# Patient Record
Sex: FEMALE | Race: BLACK OR AFRICAN AMERICAN | Employment: OTHER | URBAN - METROPOLITAN AREA
[De-identification: names, ages, dates, MRNs, and addresses within clinical notes are randomized per-mention and may not be internally consistent; named-entity substitution may affect disease eponyms.]

---

## 2019-11-07 ENCOUNTER — HOSPITAL ENCOUNTER (OUTPATIENT)
Dept: GENERAL RADIOLOGY | Age: 69
Discharge: HOME | End: 2019-11-09
Payer: MEDICARE

## 2019-11-07 DIAGNOSIS — Z12.31 ENCOUNTER FOR SCREENING MAMMOGRAM FOR MALIGNANT NEOPLASM OF BREAST: ICD-10-CM

## 2019-11-07 PROCEDURE — 77063 BREAST TOMOSYNTHESIS BI: CPT

## 2019-11-19 ENCOUNTER — TELEPHONE (OUTPATIENT)
Dept: ONCOLOGY | Age: 69
End: 2019-11-19

## 2021-04-13 ENCOUNTER — IMMUNIZATION (OUTPATIENT)
Dept: PRIMARY CARE CLINIC | Age: 71
End: 2021-04-13
Payer: MEDICARE

## 2021-04-13 PROCEDURE — 91300 COVID-19, PFIZER VACCINE 30MCG/0.3ML DOSE: CPT | Performed by: NURSE PRACTITIONER

## 2021-04-13 PROCEDURE — 0001A COVID-19, PFIZER VACCINE 30MCG/0.3ML DOSE: CPT | Performed by: NURSE PRACTITIONER

## 2021-05-05 ENCOUNTER — IMMUNIZATION (OUTPATIENT)
Dept: PRIMARY CARE CLINIC | Age: 71
End: 2021-05-05
Payer: MEDICARE

## 2021-05-05 PROCEDURE — 0002A COVID-19, PFIZER VACCINE 30MCG/0.3ML DOSE: CPT | Performed by: NURSE PRACTITIONER

## 2021-05-05 PROCEDURE — 91300 COVID-19, PFIZER VACCINE 30MCG/0.3ML DOSE: CPT | Performed by: NURSE PRACTITIONER

## 2022-06-19 ENCOUNTER — HOSPITAL ENCOUNTER (EMERGENCY)
Age: 72
Discharge: HOME | End: 2022-06-19
Attending: EMERGENCY MEDICINE
Payer: MEDICARE

## 2022-06-19 ENCOUNTER — APPOINTMENT (OUTPATIENT)
Dept: GENERAL RADIOLOGY | Age: 72
End: 2022-06-19
Payer: MEDICARE

## 2022-06-19 VITALS
RESPIRATION RATE: 16 BRPM | BODY MASS INDEX: 20.4 KG/M2 | OXYGEN SATURATION: 96 % | HEIGHT: 67 IN | SYSTOLIC BLOOD PRESSURE: 174 MMHG | HEART RATE: 98 BPM | DIASTOLIC BLOOD PRESSURE: 86 MMHG | TEMPERATURE: 99 F | WEIGHT: 130 LBS

## 2022-06-19 DIAGNOSIS — Z79.4 TYPE 2 DIABETES MELLITUS WITH HYPERGLYCEMIA, WITH LONG-TERM CURRENT USE OF INSULIN (HCC): ICD-10-CM

## 2022-06-19 DIAGNOSIS — E11.65 TYPE 2 DIABETES MELLITUS WITH HYPERGLYCEMIA, WITH LONG-TERM CURRENT USE OF INSULIN (HCC): ICD-10-CM

## 2022-06-19 DIAGNOSIS — R52 BODY ACHES: ICD-10-CM

## 2022-06-19 DIAGNOSIS — U07.1 COVID-19: Primary | ICD-10-CM

## 2022-06-19 LAB
ALBUMIN SERPL-MCNC: 4.1 G/DL (ref 3.5–5.2)
ALP BLD-CCNC: 117 U/L (ref 35–104)
ALT SERPL-CCNC: 18 U/L (ref 0–32)
ANION GAP SERPL CALCULATED.3IONS-SCNC: 16 MMOL/L (ref 7–16)
AST SERPL-CCNC: 28 U/L (ref 0–31)
BASOPHILS ABSOLUTE: 0.06 E9/L (ref 0–0.2)
BASOPHILS RELATIVE PERCENT: 0.9 % (ref 0–2)
BETA-HYDROXYBUTYRATE: 0.45 MMOL/L (ref 0.02–0.27)
BILIRUB SERPL-MCNC: 0.4 MG/DL (ref 0–1.2)
BUN BLDV-MCNC: 14 MG/DL (ref 6–23)
BURR CELLS: ABNORMAL
CALCIUM SERPL-MCNC: 9.4 MG/DL (ref 8.6–10.2)
CHLORIDE BLD-SCNC: 100 MMOL/L (ref 98–107)
CHP ED QC CHECK: NORMAL
CO2: 22 MMOL/L (ref 22–29)
CREAT SERPL-MCNC: 0.7 MG/DL (ref 0.5–1)
EKG ATRIAL RATE: 103 BPM
EKG P AXIS: 34 DEGREES
EKG P-R INTERVAL: 180 MS
EKG Q-T INTERVAL: 346 MS
EKG QRS DURATION: 80 MS
EKG QTC CALCULATION (BAZETT): 453 MS
EKG R AXIS: -23 DEGREES
EKG T AXIS: 27 DEGREES
EKG VENTRICULAR RATE: 103 BPM
EOSINOPHILS ABSOLUTE: 0.35 E9/L (ref 0.05–0.5)
EOSINOPHILS RELATIVE PERCENT: 5.2 % (ref 0–6)
GFR AFRICAN AMERICAN: >60
GFR NON-AFRICAN AMERICAN: >60 ML/MIN/1.73
GLUCOSE BLD-MCNC: 311 MG/DL
GLUCOSE BLD-MCNC: 326 MG/DL (ref 74–99)
HCT VFR BLD CALC: 42.7 % (ref 34–48)
HEMOGLOBIN: 14.1 G/DL (ref 11.5–15.5)
LYMPHOCYTES ABSOLUTE: 0.4 E9/L (ref 1.5–4)
LYMPHOCYTES RELATIVE PERCENT: 6.1 % (ref 20–42)
MCH RBC QN AUTO: 26.5 PG (ref 26–35)
MCHC RBC AUTO-ENTMCNC: 33 % (ref 32–34.5)
MCV RBC AUTO: 80.3 FL (ref 80–99.9)
METER GLUCOSE: 311 MG/DL (ref 74–99)
MONOCYTES ABSOLUTE: 0.34 E9/L (ref 0.1–0.95)
MONOCYTES RELATIVE PERCENT: 5.2 % (ref 2–12)
MYELOCYTE PERCENT: 0.9 % (ref 0–0)
NEUTROPHILS ABSOLUTE: 5.56 E9/L (ref 1.8–7.3)
NEUTROPHILS RELATIVE PERCENT: 81.7 % (ref 43–80)
NUCLEATED RED BLOOD CELLS: 0 /100 WBC
OVALOCYTES: ABNORMAL
PDW BLD-RTO: 14.2 FL (ref 11.5–15)
PH VENOUS: 7.4 (ref 7.35–7.45)
PLATELET # BLD: 290 E9/L (ref 130–450)
PMV BLD AUTO: 10.6 FL (ref 7–12)
POTASSIUM REFLEX MAGNESIUM: 4.1 MMOL/L (ref 3.5–5)
RBC # BLD: 5.32 E12/L (ref 3.5–5.5)
SARS-COV-2, NAAT: DETECTED
SODIUM BLD-SCNC: 138 MMOL/L (ref 132–146)
TEAR DROP CELLS: ABNORMAL
TOTAL PROTEIN: 7.8 G/DL (ref 6.4–8.3)
TROPONIN, HIGH SENSITIVITY: 12 NG/L (ref 0–9)
WBC # BLD: 6.7 E9/L (ref 4.5–11.5)

## 2022-06-19 PROCEDURE — 82010 KETONE BODYS QUAN: CPT

## 2022-06-19 PROCEDURE — 80053 COMPREHEN METABOLIC PANEL: CPT

## 2022-06-19 PROCEDURE — 96361 HYDRATE IV INFUSION ADD-ON: CPT

## 2022-06-19 PROCEDURE — 99285 EMERGENCY DEPT VISIT HI MDM: CPT

## 2022-06-19 PROCEDURE — 71045 X-RAY EXAM CHEST 1 VIEW: CPT

## 2022-06-19 PROCEDURE — 82962 GLUCOSE BLOOD TEST: CPT

## 2022-06-19 PROCEDURE — 93005 ELECTROCARDIOGRAM TRACING: CPT | Performed by: STUDENT IN AN ORGANIZED HEALTH CARE EDUCATION/TRAINING PROGRAM

## 2022-06-19 PROCEDURE — 2580000003 HC RX 258: Performed by: STUDENT IN AN ORGANIZED HEALTH CARE EDUCATION/TRAINING PROGRAM

## 2022-06-19 PROCEDURE — 87635 SARS-COV-2 COVID-19 AMP PRB: CPT

## 2022-06-19 PROCEDURE — 96360 HYDRATION IV INFUSION INIT: CPT

## 2022-06-19 PROCEDURE — 85025 COMPLETE CBC W/AUTO DIFF WBC: CPT

## 2022-06-19 PROCEDURE — 84484 ASSAY OF TROPONIN QUANT: CPT

## 2022-06-19 PROCEDURE — 82800 BLOOD PH: CPT

## 2022-06-19 PROCEDURE — 6370000000 HC RX 637 (ALT 250 FOR IP): Performed by: STUDENT IN AN ORGANIZED HEALTH CARE EDUCATION/TRAINING PROGRAM

## 2022-06-19 RX ORDER — 0.9 % SODIUM CHLORIDE 0.9 %
1000 INTRAVENOUS SOLUTION INTRAVENOUS ONCE
Status: COMPLETED | OUTPATIENT
Start: 2022-06-19 | End: 2022-06-19

## 2022-06-19 RX ORDER — ACETAMINOPHEN 325 MG/1
650 TABLET ORAL ONCE
Status: COMPLETED | OUTPATIENT
Start: 2022-06-19 | End: 2022-06-19

## 2022-06-19 RX ADMIN — SODIUM CHLORIDE 1000 ML: 9 INJECTION, SOLUTION INTRAVENOUS at 13:36

## 2022-06-19 RX ADMIN — ACETAMINOPHEN 650 MG: 325 TABLET ORAL at 13:12

## 2022-06-19 ASSESSMENT — PAIN SCALES - GENERAL: PAINLEVEL_OUTOF10: 5

## 2022-06-19 NOTE — ED PROVIDER NOTES
Department of Emergency Medicine   ED  Provider Note  Admit Date/RoomTime: 6/19/2022 12:44 PM  ED Room: 17/17 6/19/22  1:00 PM EDT    History of Present Illness:   Veronika Deshpande is a 70 y.o. female with a past medical history of diabetes presenting to the ED for a dry cough, body aches, fever and fatigue, beginning last night. The complaint has been persistent, moderate in severity, and worsened by nothing. Patient states her niece was recently diagnosed with [phone] and has been in contact with her. She received 2 of her COVID vaccines. Patient has never tested positive for COVID. She states her temperature was 101 at home. Patient did not take a Tylenol. She denies any nausea, vomiting, chest pain, shortness of breath, diarrhea, constipation, or abdominal pain. Review of Systems:   A complete review of systems was performed and pertinent positives and negatives are stated within HPI, all other systems reviewed and are negative.          --------------------------------------------- PAST HISTORY ---------------------------------------------  Past Medical History:  has a past medical history of Diabetes mellitus ([facility] [address] Hyperlipidemia, and Hypertension. Past Surgical History:  has no past surgical history on file. Social History:  reports that she has never smoked. She does not have any smokeless tobacco history on file. She reports that she does not drink alcohol and does not use drugs. Family History: family history is not on file. Unless otherwise noted, family history is non contributory    The patients home medications have been reviewed. Allergies: Patient has no known allergies.     -------------------------------------------------- RESULTS -------------------------------------------------  All laboratory and radiology results have been personally reviewed by myself   LABS:  Results for orders placed or performed during the hospital encounter of 06/19/22   COVID-19, Rapid    Specimen: Nasopharyngeal Swab   Result Value Ref Range    SARS-CoV-2, NAAT DETECTED (A) Not Detected   pH, venous   Result Value Ref Range    pH, Rufus 7.40 7.35 - 7.45   Beta-Hydroxybutyrate   Result Value Ref Range    Beta-Hydroxybutyrate 0.45 (H) 0.02 - 0.27 mmol/L   CBC with Auto Differential   Result Value Ref Range    WBC 6.7 4.5 - 11.5 E9/L    RBC 5.32 3.50 - 5.50 E12/L    Hemoglobin 14.1 11.5 - 15.5 g/dL    Hematocrit 42.7 34.0 - 48.0 %    MCV 80.3 80.0 - 99.9 fL    MCH 26.5 26.0 - 35.0 pg    MCHC 33.0 32.0 - 34.5 %    RDW 14.2 11.5 - 15.0 fL    Platelets 839 852 - 976 E9/L    MPV 10.6 7.0 - 12.0 fL    Neutrophils % 81.7 (H) 43.0 - 80.0 %    Lymphocytes % 6.1 (L) 20.0 - 42.0 %    Monocytes % 5.2 2.0 - 12.0 %    Eosinophils % 5.2 0.0 - 6.0 %    Basophils % 0.9 0.0 - 2.0 %    Neutrophils Absolute 5.56 1.80 - 7.30 E9/L    Lymphocytes Absolute 0.40 (L) 1.50 - 4.00 E9/L    Monocytes Absolute 0.34 0.10 - 0.95 E9/L    Eosinophils Absolute 0.35 0.05 - 0.50 E9/L    Basophils Absolute 0.06 0.00 - 0.20 E9/L    Myelocyte Percent 0.9 0 - 0 %    nRBC 0.0 /100 WBC    Norborne Cells 1+     Ovalocytes 1+     Tear Drop Cells 1+    Comprehensive Metabolic Panel w/ Reflex to MG   Result Value Ref Range    Sodium 138 132 - 146 mmol/L    Potassium reflex Magnesium 4.1 3.5 - 5.0 mmol/L    Chloride 100 98 - 107 mmol/L    CO2 22 22 - 29 mmol/L    Anion Gap 16 7 - 16 mmol/L    Glucose 326 (H) 74 - 99 mg/dL    BUN 14 6 - 23 mg/dL    CREATININE 0.7 0.5 - 1.0 mg/dL    GFR Non-African American >60 >=60 mL/min/1.73    GFR African American >60     Calcium 9.4 8.6 - 10.2 mg/dL    Total Protein 7.8 6.4 - 8.3 g/dL    Albumin 4.1 3.5 - 5.2 g/dL    Total Bilirubin 0.4 0.0 - 1.2 mg/dL    Alkaline Phosphatase 117 (H) 35 - 104 U/L    ALT 18 0 - 32 U/L    AST 28 0 - 31 U/L   Troponin   Result Value Ref Range    Troponin, High Sensitivity 12 (H) 0 - 9 ng/L   POCT Glucose   Result Value Ref Range    Glucose 311 mg/dL    QC OK? ok    POCT Glucose Result Value Ref Range    Meter Glucose 311 (H) 74 - 99 mg/dL   EKG 12 Lead   Result Value Ref Range    Ventricular Rate 103 BPM    Atrial Rate 103 BPM    P-R Interval 180 ms    QRS Duration 80 ms    Q-T Interval 346 ms    QTc Calculation (Bazett) 453 ms    P Axis 34 degrees    R Axis -23 degrees    T Axis 27 degrees       RADIOLOGY:  Interpreted by Radiologist.  XR CHEST PORTABLE   Final Result   No acute process. ------------------------- NURSING NOTES AND VITALS REVIEWED ---------------------------   The nursing notes within the ED encounter and vital signs as below have been reviewed. BP (!) 174/86   Pulse 98   Temp 99 °F (37.2 °C) (Temporal)   Resp 16   Ht 5' 6.5\" (1.689 m)   Wt 130 lb (59 kg)   SpO2 96%   BMI 20.67 kg/m²   Oxygen Saturation Interpretation: Normal      ---------------------------------------------------PHYSICAL EXAM--------------------------------------    Constitutional/General: Alert and oriented x3, well appearing, non toxic in NAD  Head: Normocephalic and atraumatic  Eyes: PERRL, EOMI, conjunctiva normal, sclera non icteric  Mouth: Oropharynx clear, handling secretions, no trismus, no asymmetry of the posterior oropharynx or uvular edema  Neck: Supple, full ROM, non tender to palpation in the midline, no stridor, no crepitus, no meningeal signs  Respiratory: Lungs clear to auscultation bilaterally, no wheezes, rales, or rhonchi. Not in respiratory distress  Cardiovascular: Tachycardic. Regular rhythm. No murmurs, gallops, or rubs. 2+ distal pulses  Chest: No chest wall tenderness  GI:  Abdomen Soft, Non tender, Non distended. No organomegaly, no palpable masses,  No rebound, guarding, or rigidity. Musculoskeletal: Moves all extremities x 4. Warm and well perfused, no clubbing, cyanosis, or edema. Capillary refill <3 seconds  Integument: skin warm and dry. No rashes.    Lymphatic: no lymphadenopathy noted  Neurologic: GCS 15, no focal deficits, symmetric strength 5/5 in the upper and lower extremities bilaterally  Psychiatric: Normal Affect      ------------------------------ ED COURSE/MEDICAL DECISION MAKING----------------------  Medications   acetaminophen (TYLENOL) tablet 650 mg (650 mg Oral Given 6/19/22 1312)   0.9 % sodium chloride bolus (0 mLs IntraVENous Stopped 6/19/22 1515)     ED Course as of 06/19/22 1705   Sun Jun 19, 2022   2150 Sin Abraham:     I have personally performed and/or participated in the history, exam, medical decision making, and procedures and agree with all pertinent clinical information unless otherwise noted. I have also reviewed and agree with the past medical, family and social history unless otherwise noted. I have discussed this patient in detail with the resident and provided the instruction and education regarding the evidence-based evaluation and treatment of myalgia, fever. Fatemeh Ovalles History: Patient with constitutional symptoms including general myalgias, nonproductive cough and fever. She has been exposed to relatives who was recently diagnosed with COVID. My findings: Mary Carter is a 70 y.o. female whom is in no distress. Physical exam reveals she is alert and oriented. Heart is regular, lungs are coarse. Abdomen soft nontender but extremities are intact no edema. Skin is warm and dry. My plan: Symptomatic and supportive care. COVID testing, labs. X-ray. Electronically signed by Renita Padilla DO on 6/19/22 at 1:49 PM EDT       [TG]   1354 EKG read and interpreted by me. Rate 103, sinus tachycardia, left axis deviation, normal NE interval, , T wave inversions in lead III, no ST elevation, stable compared to previous EKG. [TO]      ED Course User Index  [TG] Renita Padilla DO  [TO] Hellen Monet DO         Medical Decision Making:    Patient's symptoms are consistent with COVID-19 infection. Patient has stable vital signs and good oxygen saturations at rest and with ambulation.  No evidence of respiratory distress. Baseline mentation. No acute emergent findings in the ED. Patient is appropriate for outpatient management and PCP follow up. Supportive care instructions and strict ED return precautions discussed. Patient was discharged with PACs loaded and given a pulse oximeter. Counseling: The emergency provider has spoken with the patient and discussed todays results, in addition to providing specific details for the plan of care and counseling regarding the diagnosis and prognosis. Questions are answered at this time and they are agreeable with the plan.      --------------------------------- IMPRESSION AND DISPOSITION ---------------------------------    IMPRESSION  1. COVID-19    2. Type 2 diabetes mellitus with hyperglycemia, with long-term current use of insulin (HCC)    3.  Body aches        DISPOSITION  Disposition: Discharge to home  Patient condition is stable             Milka Martini DO  Resident  06/19/22 [id number]

## 2022-06-20 ENCOUNTER — CARE COORDINATION (OUTPATIENT)
Dept: CARE COORDINATION | Age: 72
End: 2022-06-20

## 2022-06-20 NOTE — CARE COORDINATION
Date/Time:  2022 12:17 PM  Attempted to reach patient by telephone. Call within 2 business days of discharge: Yes  Unable to leave message, no answer and no voice mail available. Will attempt to reach patient again. Patient contacted regarding COVID-19 diagnosis. Discussed COVID-19 related testing which was available at this time. Test results were positive. Patient informed of results, if available? Yes. Ambulatory Care Manager contacted the patient by telephone to perform post discharge assessment. Call within 2 business days of discharge: Yes. Verified name and  with patient as identifiers. Provided introduction to self, and explanation of the CTN/ACM role, and reason for call due to risk factors for infection and/or exposure to COVID-19. Symptoms reviewed with patient who verbalized the following symptoms: no new symptoms  no worsening symptoms  headache. Due to no new or worsening symptoms encounter was not routed to provider for escalation. Discussed follow-up appointments. If no appointment was previously scheduled, appointment scheduling offered: Yes. [facility] follow up appointment(s): No future appointments. Non-[facility] follow up appointment(s): na    Non-face-to-face services provided:  Obtained and reviewed discharge summary and/or continuity of care documents     Advance Care Planning:   Does patient have an Advance Directive:  reviewed and current. Educated patient about risk for severe COVID-19 due to risk factors according to CDC guidelines. ACM reviewed discharge instructions, medical action plan and red flag symptoms with the patient who verbalized understanding. Discussed COVID vaccination status: Yes. Education provided on COVID-19 vaccination as appropriate. Discussed exposure protocols and quarantine with CDC Guidelines.  Patient was given an opportunity to verbalize any questions and concerns and agrees to contact ACM or health care provider for questions related to their healthcare. Reviewed and educated patient on any new and changed medications related to discharge diagnosis     Was patient discharged with a pulse oximeter? Yes Discussed and confirmed pulse oximeter discharge instructions and when to notify provider or seek emergency care. ACM provided contact information. No further follow-up call identified based on severity of symptoms and risk factors.

## 2022-11-01 ENCOUNTER — HOSPITAL ENCOUNTER (EMERGENCY)
Age: 72
Discharge: HOME | End: 2022-11-01
Payer: MEDICARE

## 2022-11-01 ENCOUNTER — APPOINTMENT (OUTPATIENT)
Dept: GENERAL RADIOLOGY | Age: 72
End: 2022-11-01
Payer: MEDICARE

## 2022-11-01 VITALS
OXYGEN SATURATION: 97 % | DIASTOLIC BLOOD PRESSURE: 99 MMHG | HEIGHT: 66 IN | SYSTOLIC BLOOD PRESSURE: 179 MMHG | BODY MASS INDEX: 20.09 KG/M2 | HEART RATE: 98 BPM | WEIGHT: 125 LBS | TEMPERATURE: 98.1 F | RESPIRATION RATE: 16 BRPM

## 2022-11-01 DIAGNOSIS — M25.461 KNEE EFFUSION, RIGHT: ICD-10-CM
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
## 2022-11-01 DIAGNOSIS — S83.91XA SPRAIN OF RIGHT KNEE, UNSPECIFIED LIGAMENT, INITIAL ENCOUNTER: ICD-10-CM

## 2022-11-01 PROCEDURE — 6370000000 HC RX 637 (ALT 250 FOR IP)

## 2022-11-01 PROCEDURE — 99283 EMERGENCY DEPT VISIT LOW MDM: CPT

## 2022-11-01 PROCEDURE — 73562 X-RAY EXAM OF KNEE 3: CPT

## 2022-11-01 PROCEDURE — 73502 X-RAY EXAM HIP UNI 2-3 VIEWS: CPT

## 2022-11-01 PROCEDURE — 73552 X-RAY EXAM OF FEMUR 2/>: CPT

## 2022-11-01 RX ORDER — OXYCODONE HYDROCHLORIDE AND ACETAMINOPHEN 5; 325 MG/1; MG/1
1 TABLET ORAL ONCE
Status: COMPLETED | OUTPATIENT
Start: 2022-11-01 | End: 2022-11-01

## 2022-11-01 RX ORDER — TRAMADOL HYDROCHLORIDE 50 MG/1
50 TABLET ORAL EVERY 6 HOURS PRN
Qty: 12 TABLET | Refills: 0 | Status: SHIPPED | OUTPATIENT
Start: 2022-11-01 | End: 2022-11-04

## 2022-11-01 RX ORDER — NAPROXEN 500 MG/1
500 TABLET ORAL ONCE
Status: COMPLETED | OUTPATIENT
Start: 2022-11-01 | End: 2022-11-01

## 2022-11-01 RX ADMIN — OXYCODONE AND ACETAMINOPHEN 1 TABLET: 5; 325 TABLET ORAL at 18:01

## 2022-11-01 RX ADMIN — NAPROXEN 500 MG: 500 TABLET ORAL at 18:01

## 2022-11-01 ASSESSMENT — PAIN DESCRIPTION - FREQUENCY: FREQUENCY: INTERMITTENT

## 2022-11-01 ASSESSMENT — PAIN SCALES - GENERAL
PAINLEVEL_OUTOF10: 9
PAINLEVEL_OUTOF10: 8

## 2022-11-01 ASSESSMENT — PAIN - FUNCTIONAL ASSESSMENT: PAIN_FUNCTIONAL_ASSESSMENT: 0-10

## 2022-11-01 ASSESSMENT — PAIN DESCRIPTION - ORIENTATION: ORIENTATION: RIGHT

## 2022-11-01 ASSESSMENT — PAIN DESCRIPTION - LOCATION: LOCATION: KNEE

## 2022-11-01 ASSESSMENT — PAIN DESCRIPTION - PAIN TYPE: TYPE: ACUTE PAIN

## 2022-11-01 ASSESSMENT — PAIN DESCRIPTION - DESCRIPTORS: DESCRIPTORS: SHARP

## 2022-11-01 NOTE — ED PROVIDER NOTES
[facility]  Department of Emergency Medicine   ED  Encounter Note  Admit Date/RoomTime: 2022  4:40 PM  ED Room: [facility]    NAME: Darion Lozano  : 1950  MRN: [id number]     Chief Complaint:  Margrett Minder Wiljuliettemenia Konig and hurt right knee, denies hitting head unsure if she is on blood thinners)    History of Present Illness       Darion Lozano is a 70 y.o. old female who presents to the emergency department by private vehicle, for a mechanical fall which occured at 0500 yesterday at home. She reportedly bent over to pick medication up off of her floor and her leg gave out, causing her to fall backwards onto her buttocks. She believes she twisted her right knee in the process. 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 Prior to incident she had no complaints of sinus, lightheadedness, chest pain, palpitations. T Since onset the symptoms have been persistent. Her pain is aggraveated by certain movements, pressure on or palpation of painful area, or weight bearing and relieved by nothing. She has taken ibuprofen with no relief. She denies any head injury, neck pain, back pain, headache, loss of consciousness, dizziness, neck pain, chest pain, back pain, numbness, weakness, blurred vision, nausea, vomiting, or wounds. She takes ASA. She normally walks with assistance of a wheeled walker. She also has a regular walker at home for use. Daniela Belton while bending to  medication offf of floor at 055 yesterday. Fell backeards onto buttocks, but legs hyperextended outward. IBU yesterdya with no improvement. R knee swollen laterally and supraptellear. Decreased ROM, worse with extension. ROS   Pertinent positives and negatives are stated within HPI, all other systems reviewed and are negative. Past Medical History:  has a past medical history of Diabetes mellitus ([address] Hyperlipidemia, and Hypertension.     Surgical History:  has no past surgical history on file. Social History:  reports that she has never smoked. She has never used smokeless tobacco. She reports that she does not drink alcohol and does not use drugs. Family History: family history is not on file. Allergies: Patient has no known allergies. Physical Exam   Oxygen Saturation Interpretation: Normal.        ED Triage Vitals   BP Temp Temp Source Heart Rate Resp SpO2 Height Weight   11/01/22 1603 11/01/22 1544 11/01/22 1544 11/01/22 1544 11/01/22 1603 11/01/22 1544 11/01/22 1603 11/01/22 1603   (!) 179/99 98.1 °F (36.7 °C) Oral 98 16 97 % 5' 6\" (1.676 m) 125 lb (56.7 kg)         Physical Exam  Constitutional:  Alert, development consistent with age. HEENT:  NC/NT. Airway patent. Neck:  No midline or paravertebral tenderness. Normal ROM. Supple. Chest:  Symmetrical without visible rash or tenderness. Respiratory:  Lungs Clear to auscultation and breath sounds equal.  CV:  Regular rate and rhythm, normal heart sounds, without pathological murmurs, ectopy, gallops, or rubs. GI:  Abdomen Soft, nontender, good bowel sounds. No firm or pulsatile mass. Pelvis:  Stable, nontender to palpation. Back:  No midline or paravertebral tenderness. No costovertebral tenderness. Extremities: tenderness to right hip/ upper thigh, right lateral knee and mild edema to right lateral and suprapatellar region of knee, no crepitus, no palpable bony abnormality, no step off, FROm without pain to BUE and LLE  Integument:  Normal turgor. Warm, dry, without visible rash, unless noted elsewhere. Lymphatic: no lymphadenopathy noted  Neurological:  Oriented x3, GCS 15. Motor functions intact. Lab / Imaging Results   (All laboratory and radiology results have been personally reviewed by myself)  Labs:  No results found for this visit on 11/01/22. Imaging: All Radiology results interpreted by Radiologist unless otherwise noted.   XR FEMUR RIGHT (MIN 2 VIEWS) Final Result   No acute osseous abnormality. Probable knee joint effusion. XR HIP 2-3 VW W PELVIS RIGHT   Final Result   No acute abnormality of the hip. XR KNEE RIGHT (3 VIEWS)   Final Result   No acute abnormality of the knee. Mild joint effusion. ED Course / Medical Decision Making     Medications   oxyCODONE-acetaminophen (PERCOCET) 5-325 MG per tablet 1 tablet (1 tablet Oral Given 11/1/22 1801)   naproxen (NAPROSYN) tablet 500 mg (500 mg Oral Given 11/1/22 1801)     ED Course as of 11/01/22 2319   Tue Nov 01, 2022   1721 Patient just returned from imaging. [DH]      ED Course User Index  [DH] HERMAN Harris CNP     Re-examination:  11/1/22     Time: 1900  Patients symptoms show no change. Consult(s):   None    Procedure(s):  None    MDM:   Imaging obtained to rule out fracture/dislocation and joint effusion. Imaging concerning for some possible small joint effusion to right knee without evidence of fracture/dislocation. Patient will be discharged home with medications for symptom support, directions for decreased use/immobilization, and crutches. Crutch teaching was provided, however patient did not do well with crutches. Instructed on need for use of wheeled walker and was sent home with Ace wrap use as needed. Plan of Care/Counseling:  HERMAN Harris CNP reviewed today's visit with the patient in addition to providing specific details for the plan of care and counseling regarding the diagnosis and prognosis. Questions are answered at this time and are agreeable with the plan. Assessment      1. Fall, initial encounter    2. Knee effusion, right    3. Sprain of right knee, unspecified ligament, initial encounter      Plan   Discharged home.   Patient condition is stable    New Medications     Discharge Medication List as of 11/1/2022  7:18 PM        START taking these medications    Details   traMADol (ULTRAM) 50 MG tablet Take 1 tablet by mouth every 6 hours as needed for Pain for up to 3 days. Intended supply: 3 days. Take lowest dose possible to manage pain, Disp-12 tablet, R-0Print           Electronically signed by Emmett Boast, APRN - CNP   DD: 11/1/22  **This report was transcribed using voice recognition software. Every effort was made to ensure accuracy; however, inadvertent computerized transcription errors may be present.   END OF ED PROVIDER NOTE       Emmett Boast, APRN - CNP  11/01/22 7526

## 2022-11-14 ENCOUNTER — OFFICE VISIT (OUTPATIENT)
Dept: ORTHOPEDIC SURGERY | Age: 72
End: 2022-11-14
Payer: MEDICARE

## 2022-11-14 VITALS — BODY MASS INDEX: 20.09 KG/M2 | HEIGHT: 66 IN | WEIGHT: 125 LBS

## 2022-11-14 DIAGNOSIS — S89.91XA KNEE INJURY, RIGHT, INITIAL ENCOUNTER: Primary | ICD-10-CM

## 2022-11-14 PROCEDURE — 99204 OFFICE O/P NEW MOD 45 MIN: CPT | Performed by: STUDENT IN AN ORGANIZED HEALTH CARE EDUCATION/TRAINING PROGRAM

## 2022-11-14 PROCEDURE — 1123F ACP DISCUSS/DSCN MKR DOCD: CPT | Performed by: STUDENT IN AN ORGANIZED HEALTH CARE EDUCATION/TRAINING PROGRAM

## 2022-11-14 RX ORDER — LINAGLIPTIN 5 MG/1
5 TABLET, FILM COATED ORAL DAILY
COMMUNITY
Start: 2022-11-13

## 2022-11-14 RX ORDER — POTASSIUM CHLORIDE 750 MG/1
CAPSULE, EXTENDED RELEASE ORAL
COMMUNITY
Start: 2022-11-13

## 2022-11-14 NOTE — PROGRESS NOTES
New Knee Patient     Referring Provider:   No referring provider defined for this encounter. CHIEF COMPLAINT:   Chief Complaint   Patient presents with    Leg Injury    Knee Injury     Right knee injury DOI 10/31/2022. Bent over to  pill off floor, fell backward landing on buttock, heard crunch in right knee as she fell. Seen in [facility] ER X-rays done and put in immobilizer. HPI:    Ochoa Neely is a 70y.o. year old female who sustained a fall to  a pill on Halloween and had an injury to her right knee on 10/31/2022. She was seen in the emergency department and placed in a knee immobilizer at that time. Since that time she has been ambulating in a knee immobilizer. She has been using a wheelchair for long distances. She can walk back and forth the bathroom. The pain is severe and sharp and nonradiating. Is located in her knee. Denies mechanical symptoms. States his pain is 10 out of 10. Denies fever and chills. Denies numbness tingling. PAST MEDICAL HISTORY  Past Medical History:   Diagnosis Date    Diabetes mellitus ([address]     Hyperlipidemia     Hypertension        PAST SURGICAL HISTORY  History reviewed. No pertinent surgical history. FAMILY HISTORY   History reviewed. No pertinent family history.     SOCIAL HISTORY  Social History     Socioeconomic History    Marital status:      Spouse name: Not on file    Number of children: Not on file    Years of education: Not on file    Highest education level: Not on file   Occupational History    Not on file   Tobacco Use    Smoking status: Never    Smokeless tobacco: Never   Vaping Use    Vaping Use: Never used   Substance and Sexual Activity    Alcohol use: No    Drug use: No    Sexual activity: Not on file   Other Topics Concern    Not on file   Social History Narrative    ** Merged History Encounter **          Social Determinants of Health     Financial Resource Strain: Not on file   Food Insecurity: Not on file Transportation Needs: Not on file   Physical Activity: Not on file   Stress: Not on file   Social Connections: Not on file   Intimate Partner Violence: Not on file   Housing Stability: Not on file     Social History     Occupational History    Not on file   Tobacco Use    Smoking status: Never    Smokeless tobacco: Never   Vaping Use    Vaping Use: Never used   Substance and Sexual Activity    Alcohol use: No    Drug use: No    Sexual activity: Not on file       CURRENT MEDICATIONS     Current Outpatient Medications:     TRADJENTA 5 MG tablet, Take 5 mg by mouth daily, Disp: , Rfl:     potassium chloride (KLOR-CON M) 10 MEQ extended release tablet, Take 20 mEq by mouth daily, Disp: , Rfl:     losartan (COZAAR) 100 MG tablet, Take 100 mg by mouth daily, Disp: , Rfl:     amLODIPine (NORVASC) 10 MG tablet, Take 10 mg by mouth daily, Disp: , Rfl:     simvastatin (ZOCOR) 40 MG tablet, Take 40 mg by mouth nightly, Disp: , Rfl:     insulin lispro protamine & lispro (HUMALOG MIX) (75-25) 100 UNIT per ML SUSP injection vial, Inject into the skin See Admin Instructions Inject 30 units into the skin every morning and 20 units every evening., Disp: , Rfl:     gabapentin (NEURONTIN) 300 MG capsule, Take 300 mg by mouth 3 times daily, Disp: , Rfl:     aspirin 325 MG EC tablet, Take 1 tablet by mouth daily. , Disp: 30 tablet, Rfl:     potassium chloride (MICRO-K) 10 MEQ extended release capsule, , Disp: , Rfl:     ALLERGIES  No Known Allergies    Controlled Substances Monitoring:          REVIEW OF SYSTEMS:     Constitutional:  Negative for weight loss, fevers, chills, fatigue  Cardiovascular: Negative for chest pain, palpitations  Pulmonary: Negative for shortness of breath, labored breathing, cough  GI: negative for abdominal pain, nausea, vomitting   MSK: per HPI  Skin: negative for rash, open wounds    All other systems reviewed and are negative         PHYSICAL EXAM     Vitals:    11/14/22 1402   Weight: 125 lb (56.7 kg) Height: 5' 6\" (1.676 m)       Height: 5' 6\" (1.676 m)  Weight: [unfilled]  BMI:  Body mass index is 20.18 kg/m². General: The patient is alert and oriented x 3, appears to be stated age and in no distress. HEENT: head is normocephalic, atraumatic. EOMI. Neck: supple, trachea midline, no thyromegaly   Cardiovascular: peripheral pulses palpable. Normal Capillary refill   Respiratory: breathing unlabored, chest expansion symmetric   Skin: no rash, no open wounds, no erythema  Psych: normal affect; mood stable  Neurologic: gait normal, sensation grossly intact in extremities  MSK:        Lower Extremity:   Ipsilateral hip exam shows normal range of motion without pain with impingement testing. Right knee: She has extremely guarded range of motion. She is exquisitely tender to palpation diffusely around her knee in a nonanatomic distribution. With much encouragement I can get her to perform straight leg raise. She has near full extension with maybe a 5 degree extensor lag. She will only flex her knee to about 90 degrees. Knee feels stable varus valgus stress testing. She is nontender to palpation over her quad tendon or superior pole of patella. The quad tendon and patellar tendon are intact palpation. There is no effusion. IMAGING:    XR: Multiple views of the right knee, hip, and femur demonstrate severe osteoporosis without any evidence of fractures or dislocations        ASSESSMENT  Right knee pain, rule out occult fracture or partial patellar tendon tear    PLAN  -Plan discussed detail with patient. Due to her severe unrelenting pain and inability to ambulate we are going to get an MRI of her right knee to assess for possible occult nondisplaced fractures along with the integrity of her extensor mechanism. We will put her in a hinged knee brace and allow her to be partial weightbearing with her walker. We will see her back as soon as the MRI is complete to discuss the results. All of her questions were answered in detail.       Arely Pickard, DO  Orthopaedic Surgery   11/14/22   2:07 PM EST

## 2022-11-14 NOTE — PROGRESS NOTES
A hinged knee brace was applied to Her Right knee(s). Use and care of brace was reviewed with patient. The patient denied any issues with fit or comfort of the braces  Patient instructed to call our office if there are any issues with the braces.        Brace supplied by and billed for by [facility]

## 2022-12-05 ENCOUNTER — HOSPITAL ENCOUNTER (OUTPATIENT)
Dept: MRI IMAGING | Age: 72
Discharge: HOME | End: 2022-12-07
Payer: MEDICARE

## 2022-12-05 DIAGNOSIS — S89.91XA KNEE INJURY, RIGHT, INITIAL ENCOUNTER: ICD-10-CM

## 2022-12-05 PROCEDURE — 73721 MRI JNT OF LWR EXTRE W/O DYE: CPT

## 2022-12-06 ENCOUNTER — TELEPHONE (OUTPATIENT)
Dept: ORTHOPEDIC SURGERY | Age: 72
End: 2022-12-06

## 2022-12-06 NOTE — TELEPHONE ENCOUNTER
Received by fax results of MRI right knee done 12/5/2022. Please review. When do you want to see patient in FU?

## 2022-12-06 NOTE — TELEPHONE ENCOUNTER
Appt scheduled for Monday 12/12/2022 8:10 a.m. Patient informed of appt date and time. She will arrange for a ride with insurance company.

## 2022-12-12 ENCOUNTER — OFFICE VISIT (OUTPATIENT)
Dept: ORTHOPEDIC SURGERY | Age: 72
End: 2022-12-12

## 2022-12-12 VITALS — WEIGHT: 125 LBS | HEIGHT: 66 IN | BODY MASS INDEX: 20.09 KG/M2

## 2022-12-12 DIAGNOSIS — M25.561 ACUTE PAIN OF RIGHT KNEE: Primary | ICD-10-CM

## 2022-12-12 DIAGNOSIS — S82.034A CLOSED NONDISPLACED TRANSVERSE FRACTURE OF RIGHT PATELLA, INITIAL ENCOUNTER: ICD-10-CM

## 2022-12-12 NOTE — PROGRESS NOTES
DJO ROM knee brace fitted to right leg.     Brace supplied by and billed for by [facility]

## 2022-12-12 NOTE — PROGRESS NOTES
CHIEF COMPLAINT:   Chief Complaint   Patient presents with    Knee Injury     FU after MRI right knee. States pain is a little better. HPI:    Lesli Moody is a 67y.o. year old female who sustained a fall to  a pill on Halloween and had an injury to her right knee on 10/31/2022. At her last visit she was having severe pain so I ordered an MRI. She is back today to talk about the MRI. Her pain is greatly improved. She is coming out of the wheelchair and ambulating without a brace. She is here to talk about her results however she is doing much better since her last visit. PAST MEDICAL HISTORY  Past Medical History:   Diagnosis Date    Diabetes mellitus ([facility] [address]     Hyperlipidemia     Hypertension        PAST SURGICAL HISTORY  History reviewed. No pertinent surgical history. FAMILY HISTORY   History reviewed. No pertinent family history. SOCIAL HISTORY  Social History     Socioeconomic History    Marital status:       Spouse name: Not on file    Number of children: Not on file    Years of education: Not on file    Highest education level: Not on file   Occupational History    Not on file   Tobacco Use    Smoking status: Never    Smokeless tobacco: Never   Vaping Use    Vaping Use: Never used   Substance and Sexual Activity    Alcohol use: No    Drug use: No    Sexual activity: Not on file   Other Topics Concern    Not on file   Social History Narrative    ** Merged History Encounter **          Social Determinants of Health     Financial Resource Strain: Not on file   Food Insecurity: Not on file   Transportation Needs: Not on file   Physical Activity: Not on file   Stress: Not on file   Social Connections: Not on file   Intimate Partner Violence: Not on file   Housing Stability: Not on file     Social History     Occupational History    Not on file   Tobacco Use    Smoking status: Never    Smokeless tobacco: Never   Vaping Use    Vaping Use: Never used   Substance and Sexual Activity    Alcohol use: No    Drug use: No    Sexual activity: Not on file       CURRENT MEDICATIONS     Current Outpatient Medications:     TRADJENTA 5 MG tablet, Take 5 mg by mouth daily, Disp: , Rfl:     potassium chloride (KLOR-CON M) 10 MEQ extended release tablet, Take 20 mEq by mouth daily, Disp: , Rfl:     losartan (COZAAR) 100 MG tablet, Take 100 mg by mouth daily, Disp: , Rfl:     amLODIPine (NORVASC) 10 MG tablet, Take 10 mg by mouth daily, Disp: , Rfl:     simvastatin (ZOCOR) 40 MG tablet, Take 40 mg by mouth nightly, Disp: , Rfl:     insulin lispro protamine & lispro (HUMALOG MIX) (75-25) 100 UNIT per ML SUSP injection vial, Inject into the skin See Admin Instructions Inject 25 units into the skin every morning and 30 units every evening., Disp: , Rfl:     gabapentin (NEURONTIN) 300 MG capsule, Take 300 mg by mouth 3 times daily, Disp: , Rfl:     aspirin 325 MG EC tablet, Take 1 tablet by mouth daily. , Disp: 30 tablet, Rfl:     ALLERGIES  No Known Allergies    Controlled Substances Monitoring:          REVIEW OF SYSTEMS:     Constitutional:  Negative for weight loss, fevers, chills, fatigue  Cardiovascular: Negative for chest pain, palpitations  Pulmonary: Negative for shortness of breath, labored breathing, cough  GI: negative for abdominal pain, nausea, vomitting   MSK: per HPI  Skin: negative for rash, open wounds    All other systems reviewed and are negative         PHYSICAL EXAM     Vitals:    12/12/22 0850   Weight: 125 lb (56.7 kg)   Height: 5' 6\" (1.676 m)       Height: 5' 6\" (1.676 m)  Weight: [unfilled]  BMI:  Body mass index is 20.18 kg/m². General: The patient is alert and oriented x 3, appears to be stated age and in no distress. HEENT: head is normocephalic, atraumatic. EOMI. Neck: supple, trachea midline, no thyromegaly   Cardiovascular: peripheral pulses palpable.   Normal Capillary refill   Respiratory: breathing unlabored, chest expansion symmetric   Skin: no rash, no open wounds, no erythema  Psych: normal affect; mood stable  Neurologic:  sensation grossly intact in extremities  MSK:        Lower Extremity:   Ipsilateral hip exam shows normal range of motion without pain with impingement testing. Right knee: Range of motion is less guarded and painful. She is mildly tender to palpation over her patella. .  She has near full extension with maybe a 5 degree extensor lag. She will only flex her knee to about 90 degrees. She has no pain with range of motion today. Knee feels stable varus valgus stress testing. She is nontender to palpation over her quad tendon or superior pole of patella. The quad tendon and patellar tendon are intact palpation. There is no effusion. IMAGING:    MRI reviewed with the patient. This demonstrates a subtle nondisplaced patella fracture which is now 7 weeks old  X-rays today demonstrate interval displacement since her ER x-rays at the beginning of November of her patella fracture      ASSESSMENT  Right knee healing patella fracture without displacement    PLAN  -At this point she is almost 6 weeks out from her injury. Her x-rays today demonstrate interval displacement since her injury at which point the fracture was not identified. For this reason I am going to place her in a hinged knee brace locked from 0 to 30 degrees. She can be weightbearing as tolerated at this range of motion for the next 4 weeks. I will see her back in 4 weeks and as long as the fracture is continue to heal without displacement I think we can get away with nonoperative management. I did discuss the fact that if this displaces further and she loses her extensor mechanism she will require surgery. She understands and wishes to proceed.   She will follow-up in 1 month    [facility]   12/12/22  8:56 AM

## 2023-01-11 ENCOUNTER — OFFICE VISIT (OUTPATIENT)
Dept: ORTHOPEDIC SURGERY | Age: 73
End: 2023-01-11

## 2023-01-11 VITALS — BODY MASS INDEX: 20.09 KG/M2 | HEIGHT: 66 IN | WEIGHT: 125 LBS

## 2023-01-11 DIAGNOSIS — S82.034D CLOSED NONDISPLACED TRANSVERSE FRACTURE OF RIGHT PATELLA WITH ROUTINE HEALING, SUBSEQUENT ENCOUNTER: Primary | ICD-10-CM

## 2023-01-11 NOTE — PROGRESS NOTES
CHIEF COMPLAINT:   Chief Complaint   Patient presents with    Knee Injury     FU Right patella fracture 10/31/2022. States she get up to walk to bathroom as least 3 -4 times per day. HPI:    Lesli Moody is a 67y.o. year old female who presents for follow-up of nonoperative right patella fracture sustained on 10/31/2022. Patient has been wearing a hinged knee brace set from 0-30 after last encounter. Patient has been weightbearing while in the hinged knee brace. PAST MEDICAL HISTORY  Past Medical History:   Diagnosis Date    Diabetes mellitus ([address]     Hyperlipidemia     Hypertension        PAST SURGICAL HISTORY  History reviewed. No pertinent surgical history. FAMILY HISTORY   History reviewed. No pertinent family history. SOCIAL HISTORY  Social History     Socioeconomic History    Marital status:       Spouse name: Not on file    Number of children: Not on file    Years of education: Not on file    Highest education level: Not on file   Occupational History    Not on file   Tobacco Use    Smoking status: Never    Smokeless tobacco: Never   Vaping Use    Vaping Use: Never used   Substance and Sexual Activity    Alcohol use: No    Drug use: No    Sexual activity: Not on file   Other Topics Concern    Not on file   Social History Narrative    ** Merged History Encounter **          Social Determinants of Health     Financial Resource Strain: Not on file   Food Insecurity: Not on file   Transportation Needs: Not on file   Physical Activity: Not on file   Stress: Not on file   Social Connections: Not on file   Intimate Partner Violence: Not on file   Housing Stability: Not on file     Social History     Occupational History    Not on file   Tobacco Use    Smoking status: Never    Smokeless tobacco: Never   Vaping Use    Vaping Use: Never used   Substance and Sexual Activity    Alcohol use: No    Drug use: No    Sexual activity: Not on file       CURRENT MEDICATIONS     Current Outpatient Medications:     TRADJENTA 5 MG tablet, Take 5 mg by mouth daily, Disp: , Rfl:     potassium chloride (KLOR-CON M) 10 MEQ extended release tablet, Take 20 mEq by mouth daily, Disp: , Rfl:     losartan (COZAAR) 100 MG tablet, Take 100 mg by mouth daily, Disp: , Rfl:     amLODIPine (NORVASC) 10 MG tablet, Take 10 mg by mouth daily, Disp: , Rfl:     simvastatin (ZOCOR) 40 MG tablet, Take 40 mg by mouth nightly, Disp: , Rfl:     insulin lispro protamine & lispro (HUMALOG MIX) (75-25) 100 UNIT per ML SUSP injection vial, Inject into the skin See Admin Instructions Inject 25 units into the skin every morning and 30 units every evening., Disp: , Rfl:     gabapentin (NEURONTIN) 300 MG capsule, Take 300 mg by mouth 3 times daily, Disp: , Rfl:     aspirin 325 MG EC tablet, Take 1 tablet by mouth daily. , Disp: 30 tablet, Rfl:     ALLERGIES  No Known Allergies    Controlled Substances Monitoring:          REVIEW OF SYSTEMS:     Constitutional:  Negative for weight loss, fevers, chills, fatigue  Cardiovascular: Negative for chest pain, palpitations  Pulmonary: Negative for shortness of breath, labored breathing, cough  GI: negative for abdominal pain, nausea, vomitting   MSK: per HPI  Skin: negative for rash, open wounds    All other systems reviewed and are negative         PHYSICAL EXAM     Vitals:    01/11/23 1318   Weight: 125 lb (56.7 kg)   Height: 5' 6\" (1.676 m)       Height: 5' 6\" (1.676 m)  Weight: [unfilled]  BMI:  Body mass index is 20.18 kg/m². General: The patient is alert and oriented x 3, appears to be stated age and in no distress. HEENT: head is normocephalic, atraumatic. EOMI. Neck: supple, trachea midline, no thyromegaly   Cardiovascular: peripheral pulses palpable.   Normal Capillary refill   Respiratory: breathing unlabored, chest expansion symmetric   Skin: no rash, no open wounds, no erythema  Psych: normal affect; mood stable  Neurologic:  sensation grossly intact in extremities  MSK: Lower Extremity:   Ipsilateral hip exam shows normal range of motion without pain with impingement testing. Right knee: Range of motion is less guarded and painful. She is mildly tender to palpation over her patella. She has near full extension with mild extensor lag. She will only flex her knee to about 90 degrees. She has no pain with range of motion today. Knee feels stable varus valgus stress testing. She is nontender to palpation over her quad tendon or superior pole of patella. The quad tendon and patellar tendon are intact palpation. There is no effusion. IMAGING:    X-ray right knee including AP lateral and sunrise views obtained in office today and independently reviewed by myself demonstrating healed transverse fracture about the patella with callus formation. Alignment well-maintained. ASSESSMENT  Right knee healed patella fracture without displacement    PLAN  -At this point she is almost 10 weeks out from her injury. Her x-rays today demonstrate interval healing of her transverse patella fracture. Patient may now discontinue the hinged knee brace as she will permitted to have full active range of motion as tolerated. She does have some residual stiffness therefore we will place referral to physical therapy to work on strength and range of motion modalities. She may be weightbearing as tolerated as well. Follow-up in 2 months for repeat x-ray and evaluation.     [address] [place] [address]   Orthopaedic Surgery   1/11/23  1:40 PM

## 2023-02-01 ENCOUNTER — EVALUATION (OUTPATIENT)
Dept: PHYSICAL THERAPY | Age: 73
End: 2023-02-01
Payer: MEDICARE

## 2023-02-01 DIAGNOSIS — M25.561 RIGHT KNEE PAIN, UNSPECIFIED CHRONICITY: Primary | ICD-10-CM

## 2023-02-01 PROCEDURE — 97110 THERAPEUTIC EXERCISES: CPT | Performed by: PHYSICAL THERAPIST

## 2023-02-01 PROCEDURE — 97161 PT EVAL LOW COMPLEX 20 MIN: CPT | Performed by: PHYSICAL THERAPIST

## 2023-02-01 NOTE — PROGRESS NOTES
[address] [facility]   Phone: [phone]   Fax: [phone]      Physical Therapy Daily Treatment Note    Date: 2023  Patient Name: Lola Turcios  : 1950   MRN: [id number]  DOInjury: 10/31/2022   DOSx: NA  Referring Provider: Bonny Lundborg, DO  [address] [facility]     Medical Diagnosis:     R knee pain    Outcome Measure: LEFS 80%    S: see eval  O:   Time 350-425     Visit 1/ Repeat outcome measure at mid point and end. Pain 0/10                       Modalities            Manual            Stretch      IT band supine      HS supine      Quad Prone      Exercise      Bike      Heel slides X20 3s holds  TE   QS 5 sec hold x 30  TE   SLR 2x10  TE   SAQ 2x10 5s holds  TE   LAQ      Hamstring Curl       TG squats      TG calf raises      Step-ups - FWD      Step-ups - LAT      Step-ups - BWD        NMR To improve balance for safe community and home ambulation    Resisted walk      FWD      BKWD      lat      March      Side stepping      Retro walk      Heel to toe      A:  Tolerated well. Above added to written HEP and will perform daily.    P: Continue with rehab plan  Chandni Mcgill, PT DPT, PT [id number]    Treatment Charges: Mins Units   Initial Evaluation 15 1   Re-Evaluation     Ther Exercise         TE 15 1   Manual Therapy     MT     Ther Activities        TA     Gait Training          GT     Neuro Re-education NR     Modalities     Non-Billable Service Time     Other     Total Time/Units 30 2

## 2023-02-01 NOTE — PROGRESS NOTES
[address] [facility]   Phone: [phone]   Fax: [phone]         Date:  2023   Patient: Rj Knutson  : 1950  MRN: [id number]  Referring Provider: Sylvie Goldmann, DO  [address]  [facility]     Medical Diagnosis:     W31.359I (ICD-10-CM) - Closed nondisplaced transverse fracture of right patella with routine healing, subsequent encounter    R knee pain     SUBJECTIVE:     Onset date: 10/31/2022    Onset: Sudden onset    Mechanism of Injury: Pt reports that she bent over to  a pill and fell, resulting in patellar fracture. She reports biggest limitations c ambulation, bending, lifting. She has stairs in home but does not need to use them. She reports that she also had a CVA in , causing her to utilize a rollator full time. She lives with her niece that helps take care of her. Previous PT: none     Medical Management for Current Problem:  gabapentin    Chief complaint: pain, edema, decreased motion, decreased mobility, weakness, inability / limited ability to use leg, difficulty walking, difficulty with stairs, inability to participate in exercise regimen / fitness program, decreased endurance, decreased balance    Behavior: condition is waxing / waning    Pain: waxing and waning  Current: 2/10     Best: 0/10     Worst:4/10    Symptom Type/Quality: tightness  Location[de-identified] Knee: global     Aggravated by: walking, standing, stairs, inclines, declines, getting in/out of car, pivoting    Relieved by: rest, medication    Imaging results: XR KNEE RIGHT (MIN 4 VIEWS)    Result Date: 2023  Radiology exam is complete. No Radiologist dictation. Please follow up with ordering provider. Past Medical History:  Past Medical History:   Diagnosis Date    Diabetes mellitus ([address]     Hyperlipidemia     Hypertension      No past surgical history on file.     Medications:   Current Outpatient Medications   Medication Sig Dispense Refill    TRADJENTA 5 MG tablet Take 5 mg by mouth daily      potassium chloride (KLOR-CON M) 10 MEQ extended release tablet Take 20 mEq by mouth daily      losartan (COZAAR) 100 MG tablet Take 100 mg by mouth daily      amLODIPine (NORVASC) 10 MG tablet Take 10 mg by mouth daily      simvastatin (ZOCOR) 40 MG tablet Take 40 mg by mouth nightly      insulin lispro protamine & lispro (HUMALOG MIX) (75-25) 100 UNIT per ML SUSP injection vial Inject into the skin See Admin Instructions Inject 25 units into the skin every morning and 30 units every evening.      gabapentin (NEURONTIN) 300 MG capsule Take 300 mg by mouth 3 times daily      aspirin 325 MG EC tablet Take 1 tablet by mouth daily. 30 tablet      No current facility-administered medications for this visit. Occupation:  retired- [employer] .      Exercise regimen: none    Hobbies: tv    Patient Goals: pain relief, return to prior activity, get back to normal    Precautions/Contraindications: falls risk    OBJECTIVE:     Observations: well nourished female, normal affect    Inspection: normal orthopedic exam, demonstrates generalized pronated posture (forward head, protracted scapula, internally rotated shoulders, and flexed trunk and lower extremities)    Edema: mild global    Gait: antalgic gait, limp R LE    Joint/Motion:    Knee:  Right:   AROM: 100° Flexion,  -5° Extension    Left:   AROM: 120° Flexion,  0° Extension      Strength:    Knee:   Right: Hip: 3+/5 globally, Knee: Flexion 4-/5,  Extension 4-/5  Left: Hip: 4/5 globally, Knee: Flexion 4/5,  Extension 4/5    Palpation: minor tenderness over patella, atrophy noted in quad       Special Tests/Functional Screens:  NT d/t post op    Comments:       ASSESSMENT     Outcome Measure:   Lower Extremity Functional Scale (LEFS) 80% impairment    Problems:   Pain reported 0-5/10  ROM decreased   Strength decreased   Decreased functional ability with ADLs , IADLs , use of right lower extremity, walking, stairs, bending, reaching, lifting, inability to participate in exercise regimen / fitness program    Reason for Skilled Care: Pt reports to PT secondary to R patellar fracture. She has residual weakness and stiffness secondary to injury, incr her risk of falls. Pt requires skilled care to improve global ROM, strength, stability, and overall fxn mobility needed for ADL, rec, and work activity. [x] There are no barriers affecting plan of care or recovery    [] Barriers to this patient's plan of care or recovery include. Domestic Concerns:  [x] No  [] Yes:    Long Term goals (4-6 weeks)  Decrease reported pain to 0/10  Increase ROM to 0-125  Increase Strength to 4+/5 globally   Able to perform/complete the following functions/tasks: Pt will ambulate for 10 min s AD or limitation, able to negotiate a flight of stairs recip s pain, limitation, or HR, able to perform 10 STS transfers s pain or limitation or HHA   LEFS 0-40% impairment   Independent with Home Exercise Programs    Rehab Potential: [x] Good  [] Fair  [] Poor    PLAN       Treatment Plan:   [x] Therapeutic Exercise  [x] Therapeutic Activity  [x] Neuromuscular Re-education   [x] Gait Training  [x] Balance Training  [x] Aerobic conditioning  [x] Manual Therapy  [x] Massage/Fascial release   [] Work/Sport specific activities    [] Pain Neuroscience [x] Cold/hotpack  [x] Vasocompression  [x] Electrical Stimulation  [] Lumbar/Cervical Traction  [x] Ultrasound   [] Iontophoresis: 4 mg/mL Dexamethasone Sodium Phosphate 40-80 mAmin  [x] Dry Needling      [x] Instruction in HEP      []  Medication allergies reviewed for use of Dexamethasone Sodium Phosphate 4mg/ml  with iontophoresis treatments. Patient is not allergic.       The following CPT codes are likely to be used in the care of this patient: [address] [facility] PT Evaluation: Low Complexity   51742 PT Re-Evaluation   [address] [facility] Neuromuscular Re-Education   28256 Therapeutic Activities   61530 Manual Therapy   86495 Gait Training   16356 Vasopneumatic Device   47407      Suggested Professional Referral: [x] No  [] Yes:     Patient Education:  [x] Plans/Goals, Risks/Benefits discussed  [x] Home exercise program  Method of Education: [x] Verbal  [x] Demo  [x] Written  Comprehension of Education:  [x] Verbalizes understanding. [x] Demonstrates understanding. [] Needs Review. [] Demonstrates/verbalizes understanding of HEP/Ed previously given. Frequency: 1-2 days per week for 4-6 weeks    Patient understands diagnosis/prognosis and consents to treatment, plan and goals: [x] Yes    [] No     Thank you for the opportunity to work with your patient. If you have questions or comments, please contact me at numbers listed above. Electronically signed by: Diana Parkinson, PT PT DPT [id number]         Please sign Physician's Certification and return to: [address] [place] [address]  Dept: [phone]  Dept Fax: [phone]  Loc: [phone] PT , DPT PT [id number]    [id number] Certification / Comments     Frequency/Duration 1-2 days per week for 4-6 weeks. Certification period from 2/1/2023  to 4/1/2023. I have reviewed the Plan of Care established for skilled therapy services and certify that the services are required and that they will be provided while the patient is under my care.     Physician's Comments/Revisions:               Physician's Printed Name:                                           [de-identified] Signature:                                                               Date:

## 2023-02-13 ENCOUNTER — TREATMENT (OUTPATIENT)
Dept: PHYSICAL THERAPY | Age: 73
End: 2023-02-13
Payer: MEDICARE

## 2023-02-13 DIAGNOSIS — M25.561 RIGHT KNEE PAIN, UNSPECIFIED CHRONICITY: Primary | ICD-10-CM

## 2023-02-13 PROCEDURE — 97110 THERAPEUTIC EXERCISES: CPT | Performed by: PHYSICAL THERAPIST

## 2023-02-13 PROCEDURE — 97530 THERAPEUTIC ACTIVITIES: CPT | Performed by: PHYSICAL THERAPIST

## 2023-02-13 NOTE — PROGRESS NOTES
[address] [facility] [address] [facility] [address] [facility]   Phone: [phone]   Fax: [phone]      Physical Therapy Daily Treatment Note    Date: 2023  Patient Name: Samuel Mathis  : 1950   MRN: [id number]  DOInjury: 10/31/2022   DOSx: NA  Referring Provider: Cora Cordero, DO  [address] [facility]     Medical Diagnosis:     R knee pain    Outcome Measure: LEFS 80%    S: pts natalia says she does not leave bed throughout the day. She reports this is d/t fear of falling. O:   Time 424-504     Visit 2/4 Repeat outcome measure at mid point and end. Pain 0/10                       Modalities            Manual            Stretch      IT band supine      HS supine      Quad Prone      Exercise      Bike       TE    TE    TE    TE   Seated march, LAQ, clams, HS curls X20 B OTB TE   Standing marching X20 B  TA   Standing SLR flex X20 B  TA                             NMR To improve balance for safe community and home ambulation    Resisted walk      FWD      BKWD      lat      March      Side stepping      Retro walk      Heel to toe      A:  Tolerated well. General strengthening and CKC activity performed today d/t significant fall risk of patient. She has considerable foot drop on R foot secondary to stroke sustained in 2017. She is unsure if she has an AFO for this. She was educated that she needs to walk daily and cannot remain in bed d/t significant atrophy and weakness developing. She also displays significant slow gait speed and falls risk when ambulating, even c a [employer]. She was agreeable to 10 min of ambulation daily and will perform HEP. This was explained and given to natalia as well.  Will continue to progress as tolerated and will consult physician for AFO referral.  P: Continue with rehab plan  Beulah Mak, PT DPT, PT [id number]    Treatment Charges: Mins Units   Initial Evaluation     Re-Evaluation     Ther Exercise         TE 25 2   Manual Therapy     MT     Ther Activities        TA 15 1 Gait Training          GT     Neuro Re-education NR     Modalities     Non-Billable Service Time     Other     Total Time/Units 40 3

## 2023-03-14 ENCOUNTER — TELEPHONE (OUTPATIENT)
Dept: PHYSICAL THERAPY | Age: 73
End: 2023-03-14

## 2023-03-14 NOTE — TELEPHONE ENCOUNTER
Niece called in to cancel patient's appointment for 3/15/23. The options we had to reschedule didn't work with the patients niece to get her here. Finally decided on an appointment 2 weeks out 3/28/23.

## 2023-03-22 ENCOUNTER — OFFICE VISIT (OUTPATIENT)
Dept: ORTHOPEDIC SURGERY | Age: 73
End: 2023-03-22

## 2023-03-22 VITALS — WEIGHT: 125 LBS | BODY MASS INDEX: 20.09 KG/M2 | HEIGHT: 66 IN

## 2023-03-22 DIAGNOSIS — S82.034D CLOSED NONDISPLACED TRANSVERSE FRACTURE OF RIGHT PATELLA WITH ROUTINE HEALING, SUBSEQUENT ENCOUNTER: Primary | ICD-10-CM

## 2023-03-22 NOTE — PROGRESS NOTES
trachea midline, no thyromegaly   Cardiovascular: peripheral pulses palpable. Normal Capillary refill   Respiratory: breathing unlabored, chest expansion symmetric   Skin: no rash, no open wounds, no erythema  Psych: normal affect; mood stable  Neurologic:  sensation grossly intact in extremities  MSK:              Right knee: Her range of motion is improved. She is ambulating with a walker. She has full 5 5 strength with knee extension with no lag. She has flexion to 110 degrees. She has no pain with range of motion today. Knee feels stable varus valgus stress testing. She is nontender over her patella she is nontender to palpation over her quad tendon or superior pole of patella. The quad tendon and patellar tendon are intact palpation. There is no effusion. IMAGING:    X-ray right knee including AP lateral and sunrise views obtained in office today and independently reviewed by myself demonstrating healed transverse fracture about the patella with callus formation. She appears to have complete bony union of her fracture with good alignment. ASSESSMENT  Right knee healed patella fracture without displacement    PLAN  -Plan discussed in detail with patient. She has healed her fracture uneventfully. She should continue physical therapy to work on strength and range of motion. We did discuss her risk of posttraumatic arthritis of patellofemoral joint arthritis specifically. She understands and wishes proceed. This point she can follow-up as needed. She is happy with her treatment results. All of her questions were answered in detail.     Arthur Santoyo DO   Orthopaedic Surgery   3/22/23  11:54 AM

## 2023-03-28 ENCOUNTER — TREATMENT (OUTPATIENT)
Dept: PHYSICAL THERAPY | Age: 73
End: 2023-03-28

## 2023-03-28 DIAGNOSIS — M25.561 RIGHT KNEE PAIN, UNSPECIFIED CHRONICITY: Primary | ICD-10-CM

## 2023-03-28 NOTE — PROGRESS NOTES
[address] [facility]   Phone: [phone]   Fax: [phone]      Physical Therapy Daily Treatment Note    Date: 3/28/2023  Patient Name: Sreedhar Sanon  : 1950   MRN: [id number]  DOInjury: 10/31/2022   DOSx: NA  Referring Provider: Jazlyn Goff DO  [address] [facility]     Medical Diagnosis:     R knee pain    Outcome Measure: LEFS 80%    S: Pt reports she has not been ambulating as prescribed. She would like today to be final visit. O:   Time [id number]     Visit 3/4 Repeat outcome measure at mid point and end. Pain 0/10                       Modalities            Manual            Stretch      IT band supine      HS supine      Quad Prone      Exercise      Bike       TE    TE    TE    TE   Seated march, LAQ, clams, HS curls X20 B OTB TE   Standing marching X20 B  NR   Standing SLR flex X20 B  NR                             NMR To improve balance for safe community and home ambulation    Resisted walk      FWD      BKWD      lat      March      Side stepping      Retro walk      Heel to toe      A:  Tolerated well. Pt was tx today c general strength and light functional mobility to reduce risk of falls and reduce general debility. She continues to display considerable weakness and debility c difficulty c ambulation and transfers. She will d/c at this time at her request and call if she feels she requires continued care.  She continues to be a falls risk, but will be d/c at her request.  P: Continue with rehab plan  Brittany Mendez, PT DPT, PT [id number]    Treatment Charges: Mins Units   Initial Evaluation     Re-Evaluation     Ther Exercise         TE 25 2   Manual Therapy     MT     Ther Activities        TA     Gait Training          GT     Neuro Re-education NR 15 1   Modalities     Non-Billable Service Time     Other     Total Time/Units 40 3